# Patient Record
Sex: MALE | Race: WHITE
[De-identification: names, ages, dates, MRNs, and addresses within clinical notes are randomized per-mention and may not be internally consistent; named-entity substitution may affect disease eponyms.]

---

## 2023-01-17 ENCOUNTER — HOSPITAL ENCOUNTER (OUTPATIENT)
Dept: HOSPITAL 76 - LAB.S | Age: 73
Discharge: HOME | End: 2023-01-17
Attending: RADIOLOGY
Payer: MEDICARE

## 2023-01-17 DIAGNOSIS — C61: Primary | ICD-10-CM

## 2023-01-17 PROCEDURE — 36415 COLL VENOUS BLD VENIPUNCTURE: CPT

## 2023-01-17 PROCEDURE — 84403 ASSAY OF TOTAL TESTOSTERONE: CPT

## 2023-02-01 ENCOUNTER — HOSPITAL ENCOUNTER (OUTPATIENT)
Dept: HOSPITAL 76 - LAB.S | Age: 73
Discharge: HOME | End: 2023-02-01
Attending: RADIOLOGY
Payer: MEDICARE

## 2023-02-01 DIAGNOSIS — C61: Primary | ICD-10-CM

## 2023-02-01 LAB
ALBUMIN DIAFP-MCNC: 3.8 G/DL (ref 3.2–5.5)
ALBUMIN/GLOB SERPL: 1.2 {RATIO} (ref 1–2.2)
ALP SERPL-CCNC: 81 IU/L (ref 42–121)
ALT SERPL W P-5'-P-CCNC: 21 IU/L (ref 10–60)
ANION GAP SERPL CALCULATED.4IONS-SCNC: 10 MMOL/L (ref 6–13)
AST SERPL W P-5'-P-CCNC: 19 IU/L (ref 10–42)
BASOPHILS NFR BLD AUTO: 0.1 10^3/UL (ref 0–0.1)
BASOPHILS NFR BLD AUTO: 0.8 %
BILIRUB BLD-MCNC: 0.5 MG/DL (ref 0.2–1)
BUN SERPL-MCNC: 21 MG/DL (ref 6–20)
CALCIUM UR-MCNC: 9.6 MG/DL (ref 8.5–10.3)
CHLORIDE SERPL-SCNC: 102 MMOL/L (ref 101–111)
CO2 SERPL-SCNC: 25 MMOL/L (ref 21–32)
CREAT SERPLBLD-SCNC: 1.2 MG/DL (ref 0.6–1.2)
EOSINOPHIL # BLD AUTO: 0.1 10^3/UL (ref 0–0.7)
EOSINOPHIL NFR BLD AUTO: 1.8 %
ERYTHROCYTE [DISTWIDTH] IN BLOOD BY AUTOMATED COUNT: 17.8 % (ref 12–15)
GFRSERPLBLD MDRD-ARVRAT: 60 ML/MIN/{1.73_M2} (ref 89–?)
GLOBULIN SER-MCNC: 3.1 G/DL (ref 2.1–4.2)
GLUCOSE SERPL-MCNC: 119 MG/DL (ref 70–100)
HCT VFR BLD AUTO: 35.3 % (ref 42–52)
HGB UR QL STRIP: 10.8 G/DL (ref 14–18)
LYMPHOCYTES # SPEC AUTO: 1.4 10^3/UL (ref 1.5–3.5)
LYMPHOCYTES NFR BLD AUTO: 22.7 %
MCH RBC QN AUTO: 28.6 PG (ref 27–31)
MCHC RBC AUTO-ENTMCNC: 30.6 G/DL (ref 32–36)
MCV RBC AUTO: 93.4 FL (ref 80–94)
MONOCYTES # BLD AUTO: 0.6 10^3/UL (ref 0–1)
MONOCYTES NFR BLD AUTO: 9 %
NEUTROPHILS # BLD AUTO: 4 10^3/UL (ref 1.5–6.6)
NEUTROPHILS # SNV AUTO: 6.1 X10^3/UL (ref 4.8–10.8)
NEUTROPHILS NFR BLD AUTO: 65.5 %
NRBC # BLD AUTO: 0 /100WBC
NRBC # BLD AUTO: 0 X10^3/UL
PDW BLD AUTO: 10.8 FL (ref 7.4–11.4)
PLATELET # BLD: 343 10^3/UL (ref 130–450)
POTASSIUM SERPL-SCNC: 4 MMOL/L (ref 3.5–5)
PROT SPEC-MCNC: 6.9 G/DL (ref 6.7–8.2)
RBC MAR: 3.78 10^6/UL (ref 4.7–6.1)
SODIUM SERPLBLD-SCNC: 137 MMOL/L (ref 135–145)

## 2023-02-01 PROCEDURE — 80053 COMPREHEN METABOLIC PANEL: CPT

## 2023-02-01 PROCEDURE — 85025 COMPLETE CBC W/AUTO DIFF WBC: CPT

## 2023-02-01 PROCEDURE — 84403 ASSAY OF TOTAL TESTOSTERONE: CPT

## 2023-02-01 PROCEDURE — 36415 COLL VENOUS BLD VENIPUNCTURE: CPT

## 2023-02-01 PROCEDURE — 84153 ASSAY OF PSA TOTAL: CPT

## 2023-03-15 ENCOUNTER — HOSPITAL ENCOUNTER (OUTPATIENT)
Dept: HOSPITAL 76 - LAB.R | Age: 73
Discharge: HOME | End: 2023-03-15
Attending: RADIOLOGY
Payer: MEDICARE

## 2023-03-15 DIAGNOSIS — C61: Primary | ICD-10-CM

## 2023-03-15 LAB
ALBUMIN DIAFP-MCNC: 3.8 G/DL (ref 3.2–5.5)
ALBUMIN/GLOB SERPL: 1.1 {RATIO} (ref 1–2.2)
ALP SERPL-CCNC: 94 IU/L (ref 42–121)
ALT SERPL W P-5'-P-CCNC: 20 IU/L (ref 10–60)
ANION GAP SERPL CALCULATED.4IONS-SCNC: 8 MMOL/L (ref 6–13)
AST SERPL W P-5'-P-CCNC: 19 IU/L (ref 10–42)
BASOPHILS NFR BLD AUTO: 0.1 10^3/UL (ref 0–0.1)
BASOPHILS NFR BLD AUTO: 0.9 %
BILIRUB BLD-MCNC: 0.5 MG/DL (ref 0.2–1)
BUN SERPL-MCNC: 16 MG/DL (ref 6–20)
CALCIUM UR-MCNC: 9.6 MG/DL (ref 8.5–10.3)
CHLORIDE SERPL-SCNC: 103 MMOL/L (ref 101–111)
CO2 SERPL-SCNC: 27 MMOL/L (ref 21–32)
CREAT SERPLBLD-SCNC: 1.1 MG/DL (ref 0.6–1.2)
EOSINOPHIL # BLD AUTO: 0.1 10^3/UL (ref 0–0.7)
EOSINOPHIL NFR BLD AUTO: 2.4 %
ERYTHROCYTE [DISTWIDTH] IN BLOOD BY AUTOMATED COUNT: 16.4 % (ref 12–15)
GFRSERPLBLD MDRD-ARVRAT: 66 ML/MIN/{1.73_M2} (ref 89–?)
GLOBULIN SER-MCNC: 3.4 G/DL (ref 2.1–4.2)
GLUCOSE SERPL-MCNC: 95 MG/DL (ref 70–100)
HCT VFR BLD AUTO: 36.2 % (ref 42–52)
HGB UR QL STRIP: 11 G/DL (ref 14–18)
LYMPHOCYTES # SPEC AUTO: 1 10^3/UL (ref 1.5–3.5)
LYMPHOCYTES NFR BLD AUTO: 19 %
MCH RBC QN AUTO: 27.1 PG (ref 27–31)
MCHC RBC AUTO-ENTMCNC: 30.4 G/DL (ref 32–36)
MCV RBC AUTO: 89.2 FL (ref 80–94)
MONOCYTES # BLD AUTO: 0.5 10^3/UL (ref 0–1)
MONOCYTES NFR BLD AUTO: 9.6 %
NEUTROPHILS # BLD AUTO: 3.6 10^3/UL (ref 1.5–6.6)
NEUTROPHILS # SNV AUTO: 5.3 X10^3/UL (ref 4.8–10.8)
NEUTROPHILS NFR BLD AUTO: 67.9 %
NRBC # BLD AUTO: 0 /100WBC
NRBC # BLD AUTO: 0 X10^3/UL
PDW BLD AUTO: 10.1 FL (ref 7.4–11.4)
PLATELET # BLD: 282 10^3/UL (ref 130–450)
POTASSIUM SERPL-SCNC: 4 MMOL/L (ref 3.5–5)
PROT SPEC-MCNC: 7.2 G/DL (ref 6.7–8.2)
RBC MAR: 4.06 10^6/UL (ref 4.7–6.1)
SODIUM SERPLBLD-SCNC: 138 MMOL/L (ref 135–145)

## 2023-03-15 PROCEDURE — 84403 ASSAY OF TOTAL TESTOSTERONE: CPT

## 2023-03-15 PROCEDURE — 80053 COMPREHEN METABOLIC PANEL: CPT

## 2023-03-15 PROCEDURE — 85025 COMPLETE CBC W/AUTO DIFF WBC: CPT

## 2023-03-15 PROCEDURE — 84153 ASSAY OF PSA TOTAL: CPT

## 2023-07-03 ENCOUNTER — HOSPITAL ENCOUNTER (EMERGENCY)
Dept: HOSPITAL 76 - ED | Age: 73
Discharge: HOME | End: 2023-07-03
Payer: MEDICARE

## 2023-07-03 VITALS — DIASTOLIC BLOOD PRESSURE: 89 MMHG | SYSTOLIC BLOOD PRESSURE: 132 MMHG

## 2023-07-03 DIAGNOSIS — Z79.899: ICD-10-CM

## 2023-07-03 DIAGNOSIS — I10: ICD-10-CM

## 2023-07-03 DIAGNOSIS — E78.00: ICD-10-CM

## 2023-07-03 DIAGNOSIS — R91.8: ICD-10-CM

## 2023-07-03 DIAGNOSIS — Z87.891: ICD-10-CM

## 2023-07-03 DIAGNOSIS — M94.0: Primary | ICD-10-CM

## 2023-07-03 DIAGNOSIS — R07.89: ICD-10-CM

## 2023-07-03 PROCEDURE — 71250 CT THORAX DX C-: CPT

## 2023-07-03 PROCEDURE — 99284 EMERGENCY DEPT VISIT MOD MDM: CPT

## 2023-07-03 NOTE — CT REPORT
PROCEDURE:  CHEST WO

 

INDICATIONS:  right parasternal pain, onset shoveling

 

TECHNIQUE: 

Noncontrast 1mm axial images were acquired from the pulmonary apices to the posterior costophrenic an
gles.  Axial 5 mm soft tissue kernel reconstructions were performed as well as 8 mm axial MIP and cor
onal and sagittal 5 mm reformations. For radiation dose reduction, the following was used: automate
d exposure control, adjustment of mA and/or kV according to patient size.

 

COMPARISON:  None

 

FINDINGS:  

Image quality:  Excellent.  

 

Lungs and pleura: No consolidation. No pleural effusions. No  pneumothorax. Moderate centrilobular an
d paraseptal emphysema. Scattered solid pulmonary nodules, measuring no greater than 5 mm. Examples i
nclude:

-3 mm solid nodule, anterior right lower lobe (series 4, image 185).

-3.5 mm solid nodule, right lung apex (series 4, image 59)  

 

Mediastinum: Heart size is enlarged. No pericardial effusion. No large vessel abnormality. No mediast
inal adenopathy by size criteria.  Annular calcification of mitral valve. Three-vessel coronary calci
fications. Left chest wall port tip terminates in the high right atrium.

 

Chest wall and lower neck: Thyroid is unremarkable. No axillary or supraclavicular adenopathy by size
. Bilateral gynecomastia.

 

Bones: A few punctate dense lesions within the spine, nonspecific.

 

Upper Abdomen: Unremarkable.

 

IMPRESSION:

No acute findings explaining the patient's parasternal pain.

 

2 vessel coronary disease, moderate to severe for age.

 

Scattered solid pulmonary nodules, which could be postinfectious/inflammatory or indicate metastatic 
disease.

 

Reviewed by: Tyree Wyatt on 7/3/2023 8:44 AM PDT

Approved by: Tyree Wyatt on 7/3/2023 8:44 AM PDT

 

 

Station ID:  [id number]

## 2023-07-03 NOTE — ED PHYSICIAN DOCUMENTATION
PD HPI CHEST PAIN





- Stated complaint


Stated Complaint: RIB PX





- History obtained from


History obtained from: Patient





- History of Present Illness


Timing - onset: How many months ago (2)


Timing - onset during: Exertion (he states the onset of the pain there was while

he was shoveling and he hit a rock with shovel tip, and caused a jarring of the 

chest, with pain right parasternal. This continued to some degree since. More 

hurting the past 2 weeks. No rash/redness. No creptitance/deformity to feel. 

Pain with movemen)


Timing - details: Abrupt onset, Still present, Waxing and waning


Quality: Aching, Sharp, Pain


Location: Right chest (immediately parasternal at mid sternal level.)


Radiation: No: Neck, Back


Improved by: Rest


Worsened by: Movement, Palpation.  No: Inspiration


Associated symptoms: No: Shortness of air, Nausea, Feeling faint / dizzy, Cough


Similar symptoms before: Has not had sx before





Review of Systems


Constitutional: denies: Fever, Chills


Nose: denies: Rhinorrhea / runny nose, Congestion


Throat: denies: Sore throat


Cardiac: reports: Chest pain / pressure.  denies: Palpitations, Pedal edema, 

Calf pain


Respiratory: denies: Cough


Skin: denies: Rash, Lesions





PD PAST MEDICAL HISTORY





- Past Medical History


Cardiovascular: Hypertension, High cholesterol


Respiratory: Sleep apnea


Neuro: None


Endocrine/Autoimmune: None


GI: None


: Other (PROSTATE CANCER)


HEENT: Chronic vision loss, Other


Psych: Anxiety


Musculoskeletal: Osteoarthritis, Other (HIP DJD)


Derm: None





- Past Surgical History


Ortho: Other (LOWER LEFT LEG ORIF)





- Present Medications


Home Medications: 


                                Ambulatory Orders











 Medication  Instructions  Recorded  Confirmed


 


Ascorbic Acid [Vitamin C] 1,000 mg PO DAILY 08/10/22 03/06/23


 


Atorvastatin Calcium 40 mg PO DAILY 08/10/22 03/06/23


 


Cholecalciferol [Vitamin D3] 25 mcg PO DAILY 08/10/22 03/06/23


 


Multivitamin 1 each PO DAILY 08/10/22 03/06/23


 


Omega-3/Dha/Epa/Fish Oil [Fish Oil 1 each PO DAILY 08/10/22 03/06/23





1,000 mg Softgel]   


 


atenoloL [Tenormin] 50 mg PO DAILY 08/10/22 03/06/23


 


hydroCHLOROthiazide [Hydrodiuril] 25 mg PO DAILY 08/10/22 03/06/23


 


Lidocaine/Prilocain 2.5% Cream 5 applic TOP UD #1 gm 08/15/22 03/06/23





[Emla 2.5% Cream]   


 


Calcium Carbonate [Calcium]  11/14/22 


 


Abiraterone Acetate 250 mg PO DAILY 01/13/23 03/06/23


 


Meloxicam [Mobic] 7.5 mg PO BID 10 Days #20 tablet 07/03/23 














- Allergies


Allergies/Adverse Reactions: 


                                    Allergies











Allergy/AdvReac Type Severity Reaction Status Date / Time


 


No Known Drug Allergies Allergy   Verified 07/03/23 07:23














- Social History


Smoking Status: Former smoker (QUIT 2007; 1 + 1/2 PPD)





PD ED PE NORMAL





- Vitals


Vital signs reviewed: Yes





- General


General: Alert and oriented X 3, No acute distress, Well developed/nourished





- HEENT


HEENT: Pharynx benign





- Neck


Neck: Supple, no meningeal sign, No adenopathy





- Cardiac


Cardiac: RRR, No murmur





- Respiratory


Respiratory: Clear bilaterally, Other (chest wall tender in right parasternal 

area at cartilage. No redness nor sores/rash. no crepitance. )





Results





- Vitals


Vitals: 


                               Vital Signs - 24 hr











  07/03/23 07/03/23





  07:20 10:16


 


Temperature 36.3 C L 36.4 C L


 


Heart Rate 59 L 60


 


Respiratory 20 20





Rate  


 


Blood Pressure 132/105 H 132/89 H


 


O2 Saturation 98 98








                                     Oxygen











O2 Source                      Room air

















- Rads (name of study)


  ** chest CT


Relevant Findings:: Prelim report reviewed (no abnormalitiy in the area of the 

sternum to account for the pain. Multiple lung parenchymal nodules up to 3-3.5 

mm size. ), See rad report





PD Medical Decision Making





- ED course


Complexity details: reviewed results (CT chest shows normal in the area that is 

hurting. There are multiple small up to 3-3.5 mm nodules that could be 

inflammatory, infectious or early metastatic. ), considered differential 

(consider muscle strain pectoral vs costochonritis, but with his history of 

prostate CA, the concern for bone met or lung mass in area would be warranted 

and evaluated. ), d/w patient


Reviewed Lab Results: 





the CT shows multiple small dense nodules that are very small. The patient is 

getting a PET scan for in a few weeks by his Oncologist, so will better define 

the lesions I presume. I convveyed the findings with kiki nbd gave a copy of the

 CT report.  for the chest wall pain, presume muscle tendonitis/strain versus 

more likely some focal costochondritis. To use NSAIDs regularly and add tylenol.

 He did not want any opioid type meds. 





Departure





- Departure


Disposition: 01 Home, Self Care


Clinical Impression: 


 Chest wall pain, Costochondritis, acute, Multiple lung nodules on CT





Condition: Stable


Record reviewed to determine appropriate education?: Yes


Instructions:  ED Chest Pain Costochondritis


Prescriptions: 


Meloxicam [Mobic] 7.5 mg PO BID 10 Days #20 tablet


Comments: 


Your CT scan does not show any abnormalities in the area that you are hurting.  

This means no obvious fractures, dislocation, bone lesions or masses in that 

spot.





They do comment on several small nodules with largest approximately 3 to 3-1/2 

mm (fairly small).  These will be better evaluated with your PET scan coming up 

this month.





The pain in the chest wall would seem to relate to some inflammation of the 

cartilage or muscle at that spot.  We can try treating this with an anti-

inflammatory regularly for the next 7 to 10 days.  I wrote for some meloxicam 

which is a twice a day only anti-inflammatory.





To that add Tylenol 500 to 650 mg 4 times daily to help with pain.





Follow-up with your primary care if not improved well over the next several days

 and resolved by week.





I sent your prescription to [facility] pharmacy.


Discharge Date/Time: 07/03/23 10:16

## 2023-07-27 ENCOUNTER — HOSPITAL ENCOUNTER (OUTPATIENT)
Dept: HOSPITAL 76 - LAB | Age: 73
Discharge: HOME | End: 2023-07-27
Attending: RADIOLOGY
Payer: MEDICARE

## 2023-07-27 DIAGNOSIS — C61: Primary | ICD-10-CM

## 2023-07-27 LAB
ALBUMIN DIAFP-MCNC: 4.4 G/DL (ref 3.2–5.5)
ALBUMIN/GLOB SERPL: 1.5 {RATIO} (ref 1–2.2)
ALP SERPL-CCNC: 108 IU/L (ref 42–121)
ALT SERPL W P-5'-P-CCNC: 13 IU/L (ref 10–60)
ANION GAP SERPL CALCULATED.4IONS-SCNC: 5 MMOL/L (ref 6–13)
AST SERPL W P-5'-P-CCNC: 13 IU/L (ref 10–42)
BASOPHILS NFR BLD AUTO: 0 10^3/UL (ref 0–0.1)
BASOPHILS NFR BLD AUTO: 0.6 %
BILIRUB BLD-MCNC: 0.3 MG/DL (ref 0.2–1)
BUN SERPL-MCNC: 21 MG/DL (ref 6–20)
CALCIUM UR-MCNC: 10.1 MG/DL (ref 8.5–10.3)
CHLORIDE SERPL-SCNC: 103 MMOL/L (ref 101–111)
CO2 SERPL-SCNC: 29 MMOL/L (ref 21–32)
CREAT SERPLBLD-SCNC: 1.3 MG/DL (ref 0.6–1.3)
EOSINOPHIL # BLD AUTO: 0.1 10^3/UL (ref 0–0.7)
EOSINOPHIL NFR BLD AUTO: 1.6 %
ERYTHROCYTE [DISTWIDTH] IN BLOOD BY AUTOMATED COUNT: 14.8 % (ref 12–15)
GFRSERPLBLD MDRD-ARVRAT: 54 ML/MIN/{1.73_M2} (ref 89–?)
GLOBULIN SER-MCNC: 2.9 G/DL (ref 2.1–4.2)
GLUCOSE SERPL-MCNC: 97 MG/DL (ref 74–104)
HCT VFR BLD AUTO: 32.2 % (ref 42–52)
HGB UR QL STRIP: 10.5 G/DL (ref 14–18)
LYMPHOCYTES # SPEC AUTO: 0.8 10^3/UL (ref 1.5–3.5)
LYMPHOCYTES NFR BLD AUTO: 15.2 %
MCH RBC QN AUTO: 29.1 PG (ref 27–31)
MCHC RBC AUTO-ENTMCNC: 32.6 G/DL (ref 32–36)
MCV RBC AUTO: 89.2 FL (ref 80–94)
MONOCYTES # BLD AUTO: 0.6 10^3/UL (ref 0–1)
MONOCYTES NFR BLD AUTO: 10.9 %
NEUTROPHILS # BLD AUTO: 3.6 10^3/UL (ref 1.5–6.6)
NEUTROPHILS # SNV AUTO: 5.1 X10^3/UL (ref 4.8–10.8)
NEUTROPHILS NFR BLD AUTO: 71.5 %
NRBC # BLD AUTO: 0 /100WBC
NRBC # BLD AUTO: 0 X10^3/UL
PDW BLD AUTO: 9.6 FL (ref 7.4–11.4)
PLATELET # BLD: 282 10^3/UL (ref 130–450)
POTASSIUM SERPL-SCNC: 4 MMOL/L (ref 3.5–4.5)
PROT SPEC-MCNC: 7.3 G/DL (ref 6.4–8.9)
RBC MAR: 3.61 10^6/UL (ref 4.7–6.1)
SODIUM SERPLBLD-SCNC: 137 MMOL/L (ref 135–145)

## 2023-07-27 PROCEDURE — 84153 ASSAY OF PSA TOTAL: CPT

## 2023-07-27 PROCEDURE — 36415 COLL VENOUS BLD VENIPUNCTURE: CPT

## 2023-07-27 PROCEDURE — 85025 COMPLETE CBC W/AUTO DIFF WBC: CPT

## 2023-07-27 PROCEDURE — 80053 COMPREHEN METABOLIC PANEL: CPT

## 2023-07-27 PROCEDURE — 84403 ASSAY OF TOTAL TESTOSTERONE: CPT

## 2023-10-09 ENCOUNTER — HOSPITAL ENCOUNTER (OUTPATIENT)
Dept: HOSPITAL 76 - EMS | Age: 73
Discharge: ANOTHER HOSPITAL | End: 2023-10-09
Payer: MEDICARE

## 2023-10-09 DIAGNOSIS — R53.1: ICD-10-CM

## 2023-10-09 DIAGNOSIS — Z74.09: ICD-10-CM

## 2023-10-09 DIAGNOSIS — R20.0: Primary | ICD-10-CM

## 2023-10-11 ENCOUNTER — HOSPITAL ENCOUNTER (INPATIENT)
Dept: HOSPITAL 76 - MS2 | Age: 73
LOS: 6 days | Discharge: NURSING, REHAB OR LONG-TERM CARE | DRG: 948 | End: 2023-10-17
Attending: HOSPITALIST | Admitting: HOSPITALIST
Payer: MEDICARE

## 2023-10-11 DIAGNOSIS — R41.0: ICD-10-CM

## 2023-10-11 DIAGNOSIS — F32.A: ICD-10-CM

## 2023-10-11 DIAGNOSIS — Z87.891: ICD-10-CM

## 2023-10-11 DIAGNOSIS — K59.09: ICD-10-CM

## 2023-10-11 DIAGNOSIS — G47.33: ICD-10-CM

## 2023-10-11 DIAGNOSIS — I12.9: ICD-10-CM

## 2023-10-11 DIAGNOSIS — N18.30: ICD-10-CM

## 2023-10-11 DIAGNOSIS — M85.88: ICD-10-CM

## 2023-10-11 DIAGNOSIS — N40.1: ICD-10-CM

## 2023-10-11 DIAGNOSIS — R33.8: ICD-10-CM

## 2023-10-11 DIAGNOSIS — C61: ICD-10-CM

## 2023-10-11 DIAGNOSIS — F41.9: ICD-10-CM

## 2023-10-11 DIAGNOSIS — G95.29: ICD-10-CM

## 2023-10-11 DIAGNOSIS — I95.9: ICD-10-CM

## 2023-10-11 DIAGNOSIS — R00.1: ICD-10-CM

## 2023-10-11 DIAGNOSIS — C79.51: ICD-10-CM

## 2023-10-11 DIAGNOSIS — R13.10: ICD-10-CM

## 2023-10-11 DIAGNOSIS — G89.3: Primary | ICD-10-CM

## 2023-10-11 DIAGNOSIS — G83.89: ICD-10-CM

## 2023-10-11 RX ADMIN — GABAPENTIN SCH MG: 300 CAPSULE ORAL at 23:53

## 2023-10-11 RX ADMIN — MORPHINE SULFATE PRN MG: 2 INJECTION, SOLUTION INTRAMUSCULAR; INTRAVENOUS at 17:34

## 2023-10-11 RX ADMIN — DEXAMETHASONE SODIUM PHOSPHATE SCH MG: 4 INJECTION, SOLUTION INTRA-ARTICULAR; INTRALESIONAL; INTRAMUSCULAR; INTRAVENOUS; SOFT TISSUE at 23:52

## 2023-10-11 RX ADMIN — MIRTAZAPINE SCH MG: 15 TABLET, FILM COATED ORAL at 22:27

## 2023-10-11 RX ADMIN — BACLOFEN SCH MG: 10 TABLET ORAL at 22:28

## 2023-10-11 RX ADMIN — MORPHINE SULFATE PRN MG: 2 INJECTION, SOLUTION INTRAMUSCULAR; INTRAVENOUS at 20:28

## 2023-10-11 RX ADMIN — GABAPENTIN SCH MG: 300 CAPSULE ORAL at 20:28

## 2023-10-11 RX ADMIN — METHADONE HYDROCHLORIDE SCH MG: 5 TABLET ORAL at 22:27

## 2023-10-11 RX ADMIN — DEXAMETHASONE SODIUM PHOSPHATE SCH MG: 4 INJECTION, SOLUTION INTRA-ARTICULAR; INTRALESIONAL; INTRAMUSCULAR; INTRAVENOUS; SOFT TISSUE at 17:34

## 2023-10-11 NOTE — HISTORY & PHYSICAL EXAMINATION
History and Physical





- History and Physical





74 yo male w/metastatic prostate cancer, dx'd in 2016. He was found to have bone

mets in 7/23  (Rib, Rt T8, femur). He underwent XRT. He admitted to hospice on 


9/27/23 as he did not want to pursue further systemic therapy. He notes he has 

been doing very well overall despite the extent of his disease process. He


notes his pain has been fairly well controlled. He has been ambulatory and was 

out in his shop as recently as 3 days ago.. He'd been having some pain in his 

legs, but it was manageable. He also had been having some back pain, but it was 

improved w/current interventions (methadone, dexamethasone). He woke up around 2

am on 10/9/23 and took a shower. He notes he was okay at that time. However, 

later in the morning he noted weakness in the legs as well as onset of numbness.

He denied any falls. He noted that he felt "numb" from just below the nipple 

line down. He could identify pressure but not sensation. He stated he could move

his Lt leg, but not his right. He had not urinated since 0200 and did not feel 

any urge to urinate.





On exam, he had a T 5/6 dermatome level acute paralysis.  He was hyperreflexic. 

RN placed a miranda w/1400 cc urine output.  Pt elected to revoke hospice and 

pursue further diagnostics (and possible intervention).  He transferred to 

[facility] for further evaluation.  He was admitted, placed on IV 

steroids and ultimately underwent MRI on 10/10/23 which revealed "tumor wrapped 

around the spinal cord" at C5 and it was felt there was no intervention 

available for him.  After admission to the hospital, he did develop ascending 

paresis which impacted his RUE and has caused some difficulty w/deep breathing. 

Today, the Palliative Care team contacted  Hospice re: readmission and GIP 

level of care for mgmt of his IV pain medication needs.  The PC MD reported to 

me that he was needing frequent IV morphine for comfort.





Currently, pt c/o neuropathic pain primarily impacting his RUE.  He also notes 

if he moves his head too far, he develops neuropathic sxs extending down 

Bilateral medial thighs.  He continues to note some difficulty w/taking a deep 

breath and notes that attempting to take a deep breath increases the neuropathic

pain in his RUE.  He has not had a BM in 3 days.





PMH:


Metastatic prostate cancer


BPH


HTN


CKD3


JOHNNY


osteopenia


depression/anxiety


former smoker





Soc Hx:


Former smoker.  Former alcohol use (6 pack/day).  Lives in [place], [place] w/wife 

and son.





Exam:


T 36.3, HR 44 RR 16 /33 O2 sat 99% RA; pain rated 10/10 on admission


Gen- very pleasant elderly male, Alert and oriented x 3, appears uncomfortable 

when moving his RUE


HEENT - NC, face symmetric, PERRLA, EOMI, sclera anicteric, conjunctiva pale but

clear, OP clear, fair dentition


Chest -Resp excursions symmetric,  CTAB


CV - bradycardic w/RR, no M/R/G


Abd-soft, NT/ND, BTx4, no obvious masses/HSM


Extr-warm, no C/C/E, no sensation to BLEs


Skin-warm, dry, no rash


Neuro- absent sensation/movement to BLEs, able to move LUE w/o difficulty, RUE 

w/decreased function and sensation





Assessment:


1. Acute spinal cord compression d/t C5 lesion


2. Metastatic prostate cancer


3. Bradycardia


4. Urinary retention


5. Constipation





Pt w/acute onset of paralysis on 10/9/23, found to be d/t spinal cord 

compression from metastasis to C5.  He is having severe neuropathic pain, some 

respiratory symptoms and autonomic instability w/bradycardia and hypotension 

(reported at Prov).  Additionally, he has urinary retention and constipation 

consistent w/paralysis.  Will admit to [facility] level of care.  





Place on :    dexamethasone 4 mg IV q6


      IV morphine PRN


      Titrate gabapentin


      Add baclofen and d/c flexeril


      Add daily suppository - may need digital stim if ineffective


      Continue methadone


      


Pt is appropriate for GIP level of care for monitoring of sxs related to cord 

compression, mgmt of 10/10 pain, and need for both IV steroids and IV morphine. 

These interventions could not be accomplished at a lower level of care.





Code status: DNR/DNI

## 2023-10-12 RX ADMIN — MORPHINE SULFATE PRN MG: 2 INJECTION, SOLUTION INTRAMUSCULAR; INTRAVENOUS at 13:01

## 2023-10-12 RX ADMIN — DEXAMETHASONE SODIUM PHOSPHATE SCH MG: 4 INJECTION, SOLUTION INTRA-ARTICULAR; INTRALESIONAL; INTRAMUSCULAR; INTRAVENOUS; SOFT TISSUE at 17:12

## 2023-10-12 RX ADMIN — MORPHINE SULFATE PRN MG: 2 INJECTION, SOLUTION INTRAMUSCULAR; INTRAVENOUS at 17:13

## 2023-10-12 RX ADMIN — MORPHINE SULFATE PRN MG: 2 INJECTION, SOLUTION INTRAMUSCULAR; INTRAVENOUS at 22:12

## 2023-10-12 RX ADMIN — BACLOFEN SCH MG: 10 TABLET ORAL at 22:13

## 2023-10-12 RX ADMIN — DIMETHICONE PRN APPLIC: 13000 CREAM TOPICAL at 08:23

## 2023-10-12 RX ADMIN — PANTOPRAZOLE SODIUM SCH MG: 40 TABLET, DELAYED RELEASE ORAL at 06:10

## 2023-10-12 RX ADMIN — MORPHINE SULFATE PRN MG: 2 INJECTION, SOLUTION INTRAMUSCULAR; INTRAVENOUS at 16:01

## 2023-10-12 RX ADMIN — BACLOFEN SCH MG: 10 TABLET ORAL at 13:06

## 2023-10-12 RX ADMIN — BACLOFEN SCH MG: 10 TABLET ORAL at 06:10

## 2023-10-12 RX ADMIN — MORPHINE SULFATE PRN MG: 2 INJECTION, SOLUTION INTRAMUSCULAR; INTRAVENOUS at 20:02

## 2023-10-12 RX ADMIN — DIMETHICONE PRN APPLIC: 13000 CREAM TOPICAL at 18:17

## 2023-10-12 RX ADMIN — METHADONE HYDROCHLORIDE SCH MG: 5 TABLET ORAL at 22:14

## 2023-10-12 RX ADMIN — GABAPENTIN PRN MG: 300 CAPSULE ORAL at 20:03

## 2023-10-12 RX ADMIN — GABAPENTIN SCH MG: 300 CAPSULE ORAL at 13:01

## 2023-10-12 RX ADMIN — GABAPENTIN PRN MG: 300 CAPSULE ORAL at 16:01

## 2023-10-12 RX ADMIN — GABAPENTIN SCH MG: 300 CAPSULE ORAL at 22:14

## 2023-10-12 RX ADMIN — DEXAMETHASONE SODIUM PHOSPHATE SCH MG: 4 INJECTION, SOLUTION INTRA-ARTICULAR; INTRALESIONAL; INTRAMUSCULAR; INTRAVENOUS; SOFT TISSUE at 06:12

## 2023-10-12 RX ADMIN — MORPHINE SULFATE PRN MG: 2 INJECTION, SOLUTION INTRAMUSCULAR; INTRAVENOUS at 11:11

## 2023-10-12 RX ADMIN — DEXAMETHASONE SODIUM PHOSPHATE SCH MG: 4 INJECTION, SOLUTION INTRA-ARTICULAR; INTRALESIONAL; INTRAMUSCULAR; INTRAVENOUS; SOFT TISSUE at 11:12

## 2023-10-12 RX ADMIN — MORPHINE SULFATE PRN MG: 2 INJECTION, SOLUTION INTRAMUSCULAR; INTRAVENOUS at 18:15

## 2023-10-12 RX ADMIN — METHADONE HYDROCHLORIDE SCH MG: 5 TABLET ORAL at 13:01

## 2023-10-12 RX ADMIN — BISACODYL SCH MG: 10 SUPPOSITORY RECTAL at 08:23

## 2023-10-12 RX ADMIN — MORPHINE SULFATE PRN MG: 2 INJECTION, SOLUTION INTRAMUSCULAR; INTRAVENOUS at 06:20

## 2023-10-12 RX ADMIN — MORPHINE SULFATE PRN MG: 2 INJECTION, SOLUTION INTRAMUSCULAR; INTRAVENOUS at 09:58

## 2023-10-12 RX ADMIN — MIRTAZAPINE SCH MG: 15 TABLET, FILM COATED ORAL at 22:14

## 2023-10-12 RX ADMIN — DIMETHICONE PRN APPLIC: 13000 CREAM TOPICAL at 22:12

## 2023-10-12 RX ADMIN — MORPHINE SULFATE PRN MG: 2 INJECTION, SOLUTION INTRAMUSCULAR; INTRAVENOUS at 08:22

## 2023-10-12 RX ADMIN — GABAPENTIN SCH MG: 300 CAPSULE ORAL at 06:12

## 2023-10-12 RX ADMIN — METHADONE HYDROCHLORIDE SCH MG: 5 TABLET ORAL at 06:12

## 2023-10-12 NOTE — PROVIDER PROGRESS NOTE
Progress Note


72 yo male w/metastatic prostate cancer w/C5 spinal cord compression causing 

acute ascending paralysis.





S:


Pt reports he slept for about 4 hours overnight.  His pain increased and woke 

him at 6am.  He notes his pain was 8/10 this am prior to receiving IV meds.  Now

it is down to 4/10 w/intervention.  He received a suppository this am, but has 

not had a BM yet.  Some persistent respiratory difficulty when trying to take a 

full breath.





O:


Gen-A&Ox3, pleasant middle aged male


HEENT - NC, face symmetric


Chest - CTAB


CV - bradycardic w/RR, no M/R/G


Abd-soft, NT, ND, BT hypoactive


Extr- warm, No C/C/E


Neuro - decreased dexterity w/RUE but still moving it fairly well, no 

involvement to LUE, flaccid paralysis to BLEs





A:


1. Acute spinal cord compression d/t C5 lesion


2. Metastatic prostate cancer


3. Bradycardia


4. Urinary retention


5. Constipation





P:


Continue IV morphine PRN, scheduled IV dexamethasone, baclofen, methadone, 

gabapentin both scheduled and PRN


He appears slightly more bradycardic this am than yesterday.  Will monitor.


Continue miranda catheter.


RNCM checked rectal vault while we visited and noted soft stool starting to come

out of the rectum.


Hospice MSW working on post-GIP resources as well.


Discussed a visit from Hospice Chaplain-pt declined.





Discussed likely course of disease progression to include respiratory failure 

and progressive paralysis of the BUEs.  Explained that we will titrate 

medications to control dyspnea, anxiety, pain.





Continue GIP level of care for management of severe pain and need for both 

scheduled and PRN IV medications.

## 2023-10-13 RX ADMIN — METHADONE HYDROCHLORIDE SCH MG: 5 TABLET ORAL at 06:37

## 2023-10-13 RX ADMIN — MORPHINE SULFATE PRN MG: 2 INJECTION, SOLUTION INTRAMUSCULAR; INTRAVENOUS at 08:36

## 2023-10-13 RX ADMIN — GABAPENTIN SCH MG: 300 CAPSULE ORAL at 14:16

## 2023-10-13 RX ADMIN — BACLOFEN SCH MG: 10 TABLET ORAL at 06:36

## 2023-10-13 RX ADMIN — MORPHINE SULFATE PRN MG: 2 INJECTION, SOLUTION INTRAMUSCULAR; INTRAVENOUS at 07:33

## 2023-10-13 RX ADMIN — METHADONE HYDROCHLORIDE SCH MG: 5 TABLET ORAL at 14:16

## 2023-10-13 RX ADMIN — DEXAMETHASONE SODIUM PHOSPHATE SCH MG: 4 INJECTION, SOLUTION INTRA-ARTICULAR; INTRALESIONAL; INTRAMUSCULAR; INTRAVENOUS; SOFT TISSUE at 17:52

## 2023-10-13 RX ADMIN — DEXAMETHASONE SODIUM PHOSPHATE SCH MG: 4 INJECTION, SOLUTION INTRA-ARTICULAR; INTRALESIONAL; INTRAMUSCULAR; INTRAVENOUS; SOFT TISSUE at 12:34

## 2023-10-13 RX ADMIN — BISACODYL SCH: 10 SUPPOSITORY RECTAL at 08:31

## 2023-10-13 RX ADMIN — DEXAMETHASONE SODIUM PHOSPHATE SCH MG: 4 INJECTION, SOLUTION INTRA-ARTICULAR; INTRALESIONAL; INTRAMUSCULAR; INTRAVENOUS; SOFT TISSUE at 06:36

## 2023-10-13 RX ADMIN — GABAPENTIN SCH MG: 300 CAPSULE ORAL at 06:37

## 2023-10-13 RX ADMIN — BACLOFEN SCH MG: 10 TABLET ORAL at 14:16

## 2023-10-13 RX ADMIN — GABAPENTIN SCH MG: 300 CAPSULE ORAL at 21:12

## 2023-10-13 RX ADMIN — METHADONE HYDROCHLORIDE SCH MG: 5 TABLET ORAL at 21:10

## 2023-10-13 RX ADMIN — DEXAMETHASONE SODIUM PHOSPHATE SCH MG: 4 INJECTION, SOLUTION INTRA-ARTICULAR; INTRALESIONAL; INTRAMUSCULAR; INTRAVENOUS; SOFT TISSUE at 23:55

## 2023-10-13 RX ADMIN — MIRTAZAPINE SCH MG: 15 TABLET, FILM COATED ORAL at 21:12

## 2023-10-13 RX ADMIN — MORPHINE SULFATE PRN MG: 2 INJECTION, SOLUTION INTRAMUSCULAR; INTRAVENOUS at 11:19

## 2023-10-13 RX ADMIN — GABAPENTIN PRN MG: 300 CAPSULE ORAL at 17:12

## 2023-10-13 RX ADMIN — DEXAMETHASONE SODIUM PHOSPHATE SCH: 4 INJECTION, SOLUTION INTRA-ARTICULAR; INTRALESIONAL; INTRAMUSCULAR; INTRAVENOUS; SOFT TISSUE at 06:27

## 2023-10-13 RX ADMIN — BACLOFEN SCH MG: 10 TABLET ORAL at 21:10

## 2023-10-13 RX ADMIN — PANTOPRAZOLE SODIUM SCH MG: 40 TABLET, DELAYED RELEASE ORAL at 06:37

## 2023-10-13 NOTE — PROVIDER PROGRESS NOTE
Progress Note


72 yo male w/metastatic prostate cancer w/C5 spinal cord compression causing 

acute ascending paralysis.





S:


Pt reports he slept for about 4 hours again overnight, but feels his sleep 

quality was improved.  He feels he is better able to take a deep breath w/o pain

in his arm, but has required frequent IV pain medication (received 20 mg IV in 

24 hours).  He was successfully able to have multiple BMs yesterday.  Earlier 

today, he notes he choked on food.  He had pumpkin bread that his dtr brought.  

He notes it got stuck and he couldn't cough it out.  RN came and he required 

deep suctioning to remove the food bolus.  He notes his cough has become 

extremely weak.  He notes his RUE is more "clumsy" than it was yesterday.  Dtr 

notes that he has very little dexterity in his right hand.  He denies any 

decrease in function in his LUE, but notes "a spot" that is starting to feel 

abnormal around his elbow, which is where his sxs started on his RUE.  Pain was 

rated 7-8/10 for most of the past 24 hrs.





O:


Gen-A&Ox3, pleasant middle aged male


HEENT - NC, face symmetric


Chest - CTAB


CV - bradycardic w/RR, no M/R/G


Abd-soft, NT, ND, BT hypoactive


Extr- warm, No C/C/E


Neuro - decreased overall movement and dexterity to RUE, he is using his LUE to 

move the RUE during my visit, flaccid paralysis to BLEs





A:


1. Acute spinal cord compression d/t C5 lesion


2. Metastatic prostate cancer


3. Bradycardia


4. NEW dysphagia


5. Urinary retention


6. Constipation - resolved





P:


Continue IV morphine PRN, scheduled IV dexamethasone, baclofen, methadone, 

gabapentin both scheduled and PRN.  Will increase methadone by 5 mg/day to work 

on improving overall pain control.





He remains bradycardic, about the same as yesterday.  





He has developed new dysphagia today, requiring urgent RN suctioning.





Continue miranda catheter for urinary retention.  Constipation resolved w/use of 

dulcolax suppository.





Hospice MSW working on post-GIP resources.





Pt aware of likelycourse of disease progression to include respiratory failure 

and progressive paralysis of the BUEs.  





Continue GIP level of care for management of uncontrolled pain and need for both

scheduled and PRN IV medications, as well as new onset dysphagia w/inability to 

clear his obstruction.

## 2023-10-14 RX ADMIN — PANTOPRAZOLE SODIUM SCH MG: 40 TABLET, DELAYED RELEASE ORAL at 06:08

## 2023-10-14 RX ADMIN — DEXAMETHASONE SODIUM PHOSPHATE SCH MG: 4 INJECTION, SOLUTION INTRA-ARTICULAR; INTRALESIONAL; INTRAMUSCULAR; INTRAVENOUS; SOFT TISSUE at 23:35

## 2023-10-14 RX ADMIN — METHADONE HYDROCHLORIDE SCH MG: 5 TABLET ORAL at 21:09

## 2023-10-14 RX ADMIN — GABAPENTIN PRN MG: 300 CAPSULE ORAL at 11:00

## 2023-10-14 RX ADMIN — DEXAMETHASONE SODIUM PHOSPHATE SCH MG: 4 INJECTION, SOLUTION INTRA-ARTICULAR; INTRALESIONAL; INTRAMUSCULAR; INTRAVENOUS; SOFT TISSUE at 12:40

## 2023-10-14 RX ADMIN — BACLOFEN SCH MG: 10 TABLET ORAL at 13:54

## 2023-10-14 RX ADMIN — GABAPENTIN PRN MG: 300 CAPSULE ORAL at 16:12

## 2023-10-14 RX ADMIN — GABAPENTIN SCH MG: 300 CAPSULE ORAL at 21:09

## 2023-10-14 RX ADMIN — BACLOFEN SCH MG: 10 TABLET ORAL at 06:07

## 2023-10-14 RX ADMIN — MORPHINE SULFATE PRN MG: 2 INJECTION, SOLUTION INTRAMUSCULAR; INTRAVENOUS at 07:47

## 2023-10-14 RX ADMIN — BISACODYL SCH MG: 10 SUPPOSITORY RECTAL at 09:11

## 2023-10-14 RX ADMIN — DEXAMETHASONE SODIUM PHOSPHATE SCH MG: 4 INJECTION, SOLUTION INTRA-ARTICULAR; INTRALESIONAL; INTRAMUSCULAR; INTRAVENOUS; SOFT TISSUE at 19:07

## 2023-10-14 RX ADMIN — GABAPENTIN SCH MG: 300 CAPSULE ORAL at 13:55

## 2023-10-14 RX ADMIN — METHADONE HYDROCHLORIDE SCH MG: 5 TABLET ORAL at 13:55

## 2023-10-14 RX ADMIN — GABAPENTIN SCH MG: 300 CAPSULE ORAL at 06:09

## 2023-10-14 RX ADMIN — MORPHINE SULFATE PRN MG: 2 INJECTION, SOLUTION INTRAMUSCULAR; INTRAVENOUS at 11:01

## 2023-10-14 RX ADMIN — METHADONE HYDROCHLORIDE SCH MG: 5 TABLET ORAL at 09:10

## 2023-10-14 RX ADMIN — DEXAMETHASONE SODIUM PHOSPHATE SCH MG: 4 INJECTION, SOLUTION INTRA-ARTICULAR; INTRALESIONAL; INTRAMUSCULAR; INTRAVENOUS; SOFT TISSUE at 06:08

## 2023-10-14 RX ADMIN — MIRTAZAPINE SCH MG: 15 TABLET, FILM COATED ORAL at 21:09

## 2023-10-14 RX ADMIN — BACLOFEN SCH MG: 10 TABLET ORAL at 21:09

## 2023-10-14 NOTE — PROVIDER PROGRESS NOTE
Progress Note


74 yo male w/metastatic prostate cancer w/C5 spinal cord compression causing 

acute ascending paralysis.





S:


Over the past 24 hours, pt notes he has continued to have RUE neuropathic pain. 

He notes that he feels he has had some benefit from the increased dose of the 

methadone as well as the extra doses of gabapentin.  He continues to struggle to

sleep.  No aspiration event since yesterday.  He notes he did have to use the 

[place] 2 times yesterday.  Son in law notes that at times the pt's speech has 

been slurred.  He notes that when he first arrived in the pt's room, he seemed a

bit out of it and he was worried the pt had a CVA.





O:


Gen-A&Ox3, pleasant middle aged male


HEENT - NC, face symmetric


Chest - CTAB


CV - bradycardic w/RR, no M/R/G


Abd-soft, NT, ND, BT hypoactive


Extr- warm, No C/C/E


Neuro - poor control of the RUE, he is using his LUE to move the RUE during my 

visit, flaccid paralysis to BLEs





A:


1. Acute spinal cord compression d/t C5 lesion


2. Metastatic prostate cancer


3. Bradycardia


4. Dysphagia


5. Urinary retention


6. Constipation - resolved





P:


Continue IV morphine PRN, scheduled IV dexamethasone, baclofen, methadone, 

gabapentin both scheduled and PRN.  Methadone increased yesterday to 7.5/5/7.5 

w/some improvement.  





He remains bradycardic, about the same as yesterday.  





No further dysphagia since yesterday but new cognitive changes noted today per 

family.





Continue miranda catheter for urinary retention.  Constipation resolved w/use of 

dulcolax suppository.





Hospice MSW working on post-GIP resources.





Pt aware of likely course of disease progression to include respiratory failure 

and progressive paralysis of the BUEs.  





Continue GIP level of care for management of uncontrolled pain and need for both

scheduled and PRN IV medications.  Will also continue to monitor for new neuro 

changes given subtle changes in his speech noted today by family.

## 2023-10-15 RX ADMIN — DEXAMETHASONE SODIUM PHOSPHATE SCH MG: 4 INJECTION, SOLUTION INTRA-ARTICULAR; INTRALESIONAL; INTRAMUSCULAR; INTRAVENOUS; SOFT TISSUE at 12:22

## 2023-10-15 RX ADMIN — BACLOFEN SCH MG: 10 TABLET ORAL at 05:57

## 2023-10-15 RX ADMIN — METHADONE HYDROCHLORIDE SCH MG: 5 TABLET ORAL at 14:31

## 2023-10-15 RX ADMIN — GABAPENTIN SCH MG: 300 CAPSULE ORAL at 14:31

## 2023-10-15 RX ADMIN — BACLOFEN SCH MG: 10 TABLET ORAL at 14:30

## 2023-10-15 RX ADMIN — MIRTAZAPINE SCH MG: 15 TABLET, FILM COATED ORAL at 21:14

## 2023-10-15 RX ADMIN — BISACODYL SCH MG: 10 SUPPOSITORY RECTAL at 08:53

## 2023-10-15 RX ADMIN — GABAPENTIN SCH MG: 300 CAPSULE ORAL at 21:15

## 2023-10-15 RX ADMIN — DEXAMETHASONE SODIUM PHOSPHATE SCH MG: 4 INJECTION, SOLUTION INTRA-ARTICULAR; INTRALESIONAL; INTRAMUSCULAR; INTRAVENOUS; SOFT TISSUE at 19:36

## 2023-10-15 RX ADMIN — GABAPENTIN PRN MG: 300 CAPSULE ORAL at 11:04

## 2023-10-15 RX ADMIN — MORPHINE SULFATE PRN MG: 2 INJECTION, SOLUTION INTRAMUSCULAR; INTRAVENOUS at 12:22

## 2023-10-15 RX ADMIN — MORPHINE SULFATE PRN MG: 2 INJECTION, SOLUTION INTRAMUSCULAR; INTRAVENOUS at 08:53

## 2023-10-15 RX ADMIN — METHADONE HYDROCHLORIDE SCH MG: 5 TABLET ORAL at 08:54

## 2023-10-15 RX ADMIN — MORPHINE SULFATE PRN MG: 2 INJECTION, SOLUTION INTRAMUSCULAR; INTRAVENOUS at 11:04

## 2023-10-15 RX ADMIN — GABAPENTIN SCH MG: 300 CAPSULE ORAL at 05:57

## 2023-10-15 RX ADMIN — PANTOPRAZOLE SODIUM SCH MG: 40 TABLET, DELAYED RELEASE ORAL at 05:57

## 2023-10-15 RX ADMIN — DEXAMETHASONE SODIUM PHOSPHATE SCH MG: 4 INJECTION, SOLUTION INTRA-ARTICULAR; INTRALESIONAL; INTRAMUSCULAR; INTRAVENOUS; SOFT TISSUE at 05:58

## 2023-10-15 RX ADMIN — BACLOFEN SCH MG: 10 TABLET ORAL at 21:15

## 2023-10-15 RX ADMIN — METHADONE HYDROCHLORIDE SCH MG: 5 TABLET ORAL at 21:16

## 2023-10-15 NOTE — PROVIDER PROGRESS NOTE
Progress Note





72 yo male w/metastatic prostate cancer w/C5 spinal cord compression causing 

acute ascending paralysis.


S:


Over the past 24 hours, pt notes he has continued to have RUE neuropathic pain 

and typically rates it as 6-8/10.  So far today, he has needed 3 PRN IV doses of

morphine and 1 of PRN IV lorazepam.  He is using an ice pack for neck pain.  He 

has routinely needed both doses of PRN gabapentin as well.  No further 

aspiration.  He does seem a bit more confused today, having a hard time tracking

conversation.  He believed the hospice RN has 4-5 children (he has known since 

admission that she doesn't have any children).  He also made a couple of strange

conversation transitions that were difficult to follow.  He reports poor sleep.





O:


Gen-A&Ox2-3, pleasant middle aged male


HEENT - NC, face symmetric


Chest - CTAB


CV - mildly bradycardic w/RR, no M/R/G


Abd-soft, NT, ND, BT hypoactive


Extr- warm, No C/C/E


Neuro - poor control of the RUE,with his arm frequently flopping clumsily when 

he moves it, flaccid paralysis to BLEs





A:


1. Acute spinal cord compression d/t C5 lesion


2. Metastatic prostate cancer


3. Bradycardia


4. Dysphagia - no recurrence


5. Urinary retention


6. Confusion





P:


Continue IV morphine PRN, scheduled IV dexamethasone, baclofen, methadone, 

gabapentin both scheduled and PRN.  Will increase gabapentin to 600/300/600. 

Methadone increased 10/13/23 to 7.5/5/7.5.  





Bradycardia is mild at this point.  Asymptomatic. 





No further dysphagia since 10/13.





Continue miranda catheter for urinary retention.  





He is demonstrating some confusion today.  Will monitor.





Hospice MSW working on post-GIP resources.





Pt aware of likely course of disease progression to include respiratory failure 

and progressive paralysis of the BUEs.  





Continue GIP level of care for management of uncontrolled pain and need for both

scheduled and PRN IV medications.

## 2023-10-16 RX ADMIN — DEXAMETHASONE SODIUM PHOSPHATE SCH MG: 4 INJECTION, SOLUTION INTRA-ARTICULAR; INTRALESIONAL; INTRAMUSCULAR; INTRAVENOUS; SOFT TISSUE at 06:50

## 2023-10-16 RX ADMIN — DEXAMETHASONE SODIUM PHOSPHATE SCH MG: 4 INJECTION, SOLUTION INTRA-ARTICULAR; INTRALESIONAL; INTRAMUSCULAR; INTRAVENOUS; SOFT TISSUE at 00:40

## 2023-10-16 RX ADMIN — BACLOFEN SCH MG: 10 TABLET ORAL at 06:45

## 2023-10-16 RX ADMIN — DIMETHICONE PRN APPLIC: 13000 CREAM TOPICAL at 22:25

## 2023-10-16 RX ADMIN — GABAPENTIN SCH MG: 300 CAPSULE ORAL at 09:52

## 2023-10-16 RX ADMIN — MIRTAZAPINE SCH MG: 15 TABLET, FILM COATED ORAL at 20:52

## 2023-10-16 RX ADMIN — BISACODYL SCH MG: 10 SUPPOSITORY RECTAL at 09:52

## 2023-10-16 RX ADMIN — PANTOPRAZOLE SODIUM SCH MG: 40 TABLET, DELAYED RELEASE ORAL at 06:46

## 2023-10-16 RX ADMIN — GABAPENTIN SCH MG: 300 CAPSULE ORAL at 14:08

## 2023-10-16 RX ADMIN — DEXAMETHASONE SODIUM PHOSPHATE SCH MG: 4 INJECTION, SOLUTION INTRA-ARTICULAR; INTRALESIONAL; INTRAMUSCULAR; INTRAVENOUS; SOFT TISSUE at 15:18

## 2023-10-16 RX ADMIN — MORPHINE SULFATE PRN MG: 2 INJECTION, SOLUTION INTRAMUSCULAR; INTRAVENOUS at 09:53

## 2023-10-16 RX ADMIN — GABAPENTIN SCH MG: 300 CAPSULE ORAL at 20:51

## 2023-10-16 RX ADMIN — DEXAMETHASONE SODIUM PHOSPHATE SCH MG: 4 INJECTION, SOLUTION INTRA-ARTICULAR; INTRALESIONAL; INTRAMUSCULAR; INTRAVENOUS; SOFT TISSUE at 22:24

## 2023-10-16 RX ADMIN — BACLOFEN SCH MG: 10 TABLET ORAL at 22:12

## 2023-10-16 RX ADMIN — METHADONE HYDROCHLORIDE SCH MG: 5 TABLET ORAL at 14:21

## 2023-10-16 RX ADMIN — MORPHINE SULFATE PRN MG: 2 INJECTION, SOLUTION INTRAMUSCULAR; INTRAVENOUS at 00:50

## 2023-10-16 RX ADMIN — METHADONE HYDROCHLORIDE SCH MG: 5 TABLET ORAL at 10:40

## 2023-10-16 RX ADMIN — BACLOFEN SCH MG: 10 TABLET ORAL at 14:09

## 2023-10-16 RX ADMIN — METHADONE HYDROCHLORIDE SCH MG: 5 TABLET ORAL at 22:12

## 2023-10-16 NOTE — PROVIDER PROGRESS NOTE
Progress Note





72 yo male w/metastatic prostate cancer w/C5 spinal cord compression causing 

acute ascending paralysis.


S:


Over the past 24 hours, pt notes he has continued to have RUE neuropathic pain 

which has been as high as 10/10 this am.  He has needed 10 mg of IV morphine 

since yesterday am (only 2 doses since my visit yesterday afternoon).  No PRN 

gabapentin since increasing the scheduled dose yesterday.  He continues to have 

some confusion - mentioned graffiti on the brick wall outside of his hospital 

room (which isn't there) and was observed shaking pepper onto his pineapple on 

his lunch tray.  He slept well last night.  No family at bedside.





O:


Gen-A&Ox2-3, pleasant middle aged male


HEENT - NC, face symmetric


Chest - CTAB


CV - mildly bradycardic w/RR, no M/R/G


Abd-soft, NT, ND, BT hypoactive


Extr- warm, No C/C/E


Neuro - poor control of the RUE,with his arm frequently flopping clumsily when 

he moves it, flaccid paralysis to BLEs





A:


1. Acute spinal cord compression d/t C5 lesion


2. Metastatic prostate cancer


3. Bradycardia


4. Dysphagia - no recurrence


5. Urinary retention


6. Confusion





P:


Continue IV morphine PRN.  Will increase methadone to 7.5 mg TID today.  Will 

reduce IV dex from 4 mg q6 to 4 mg q8. Plan to transition to po dex tomorrow.  

Will d/c IV scheduled lorazepam and give it po.   





Bradycardia remains mild and asymptomatic.  No dysphagia since 10/13.





Continue miranda catheter for urinary retention.  





He continues to have confusion, which is likely multifactorial from medications 

and disease process.





Hospice MSW working on disposition.  Pt states he would like to go home, rather 

than to an off-island SNF.





Pt aware of likely course of disease progression to include respiratory failure 

and progressive paralysis of the BUEs.  





Will transition to C LOC as pt has only needed 2 doses of PRN IV morphine 

since my visit yesterday and sxs appear to be well managed.

## 2023-10-17 ENCOUNTER — HOSPITAL ENCOUNTER (OUTPATIENT)
Dept: HOSPITAL 76 - EMS | Age: 73
Discharge: NURSING, REHAB OR LONG-TERM CARE | End: 2023-10-17
Attending: HOSPITALIST
Payer: COMMERCIAL

## 2023-10-17 VITALS — SYSTOLIC BLOOD PRESSURE: 126 MMHG | OXYGEN SATURATION: 96 % | DIASTOLIC BLOOD PRESSURE: 67 MMHG

## 2023-10-17 DIAGNOSIS — C61: ICD-10-CM

## 2023-10-17 DIAGNOSIS — R41.0: ICD-10-CM

## 2023-10-17 DIAGNOSIS — Z51.5: Primary | ICD-10-CM

## 2023-10-17 DIAGNOSIS — Z74.01: ICD-10-CM

## 2023-10-17 DIAGNOSIS — C79.51: ICD-10-CM

## 2023-10-17 RX ADMIN — METHADONE HYDROCHLORIDE SCH MG: 5 TABLET ORAL at 06:48

## 2023-10-17 RX ADMIN — GABAPENTIN SCH MG: 300 CAPSULE ORAL at 14:21

## 2023-10-17 RX ADMIN — BISACODYL SCH MG: 10 SUPPOSITORY RECTAL at 08:42

## 2023-10-17 RX ADMIN — BACLOFEN SCH MG: 10 TABLET ORAL at 06:43

## 2023-10-17 RX ADMIN — PANTOPRAZOLE SODIUM SCH MG: 40 TABLET, DELAYED RELEASE ORAL at 06:49

## 2023-10-17 RX ADMIN — METHADONE HYDROCHLORIDE SCH MG: 5 TABLET ORAL at 14:21

## 2023-10-17 RX ADMIN — GABAPENTIN SCH MG: 300 CAPSULE ORAL at 08:42

## 2023-10-17 RX ADMIN — BACLOFEN SCH MG: 10 TABLET ORAL at 14:22

## 2023-10-17 NOTE — DISCHARGE SUMMARY
Discharge Summary


Admit Date: 10/11/23


Discharge Date: 10/17/23


Discharging Provider: Nya


Code Status: Do Not Attempt Resuscitation


Condition at Discharge: Fair


Discharge Disposition: 03 SNF DC/Xfer


Discharge Facility Name: [facility]





- DIAGNOSES


Admission Diagnoses: 





1. Acute spinal cord compression d/t C5 lesion


2. Metastatic prostate cancer


3. Bradycardia


4. Urinary retention


5. Constipation


Discharge Diagnoses with Status of Each Condition: 





1. Acute spinal cord compression d/t C5 lesion,unchanged


2. Metastatic prostate cancer, unchanged


3. Bradycardia, improved


4. Urinary retention, stable, s/p miranda catheter


5. Constipation, improved


6. Dysphagia - single episode, resolved


7. Confusion-mild, persistent





- HPI


History of Present Illness: 





74 yo male w/metastatic prostate cancer, dx'd in 2016. He was found to have bone

mets in 7/23  (Rib, Rt T8, femur). He underwent XRT. He admitted to hospice on 


9/27/23 as he did not want to pursue further systemic therapy. He notes he has 

been doing very well overall despite the extent of his disease process. He


notes his pain has been fairly well controlled. He has been ambulatory and was 

out in his shop as recently as 3 days ago.. He'd been having some pain in his 

legs, but it was manageable. He also had been having some back pain, but it was 

improved w/current interventions (methadone, dexamethasone). He woke up around 2

am on 10/9/23 and took a shower. He notes he was okay at that time. However, 

later in the morning he noted weakness in the legs as well as onset of numbness.

He denied any falls. He noted that he felt "numb" from just below the nipple 

line down. He could identify pressure but not sensation. He stated he could move

his Lt leg, but not his right. He had not urinated since 0200 and did not feel 

any urge to urinate.





On exam, he had a T 5/6 dermatome level acute paralysis.  He was hyperreflexic. 

RN placed a miranda w/1400 cc urine output.  Pt elected to revoke hospice and 

pursue further diagnostics (and possible intervention).  [facility].  He was admitted, placed on IV steroids

and ultimately underwent MRI on 10/10/23 which revealed "tumor wrapped around 

the spinal cord" at C5 and it was felt there was no intervention available for 

him.  After admission to the hospital, he did develop ascending paresis which 

impacted his RUE and has caused some difficulty w/deep breathing.  Today, the 

Palliative Care team contacted [facility] re: readmission and [facility] level of care 

for mgmt of his IV pain medication needs.  The PC MD reported to me that he was 

needing frequent IV morphine for comfort.





Currently, pt c/o neuropathic pain primarily impacting his RUE.  He also notes 

if he moves his head too far, he develops neuropathic sxs extending down 

Bilateral medial thighs.  He continues to note some difficulty w/taking a deep 

breath and notes that attempting to take a deep breath increases the neuropathic

pain in his RUE.  He has not had a BM in 3 days.





- HOSPITAL COURSE


Hospital Course: 





Pt was admitted to hospice [facility] level of care for management of pain and acute 

ascending paralysis d/t tumor expansion at C5 and throughout the spinal column. 

He was placed on IV steroids, IV morphine, IV lorazepam as well as po methadone,

gabapentin, baclofen and bowel regimen.  Over the course of his hospital stay, 

his pain medications were titrated up w/good effect.  He was transitioned to [facility]

level of care on 10/16/23.  He was accepted to [facility] for snf 

care on 10/17/23.  Pt is discharged in fair condition.  His prognosis is likely 

at most 2-3 weeks.





- ALLERGIES


Allergies/Adverse Reactions: 


                                    Allergies











Allergy/AdvReac Type Severity Reaction Status Date / Time


 


No Known Drug Allergies Allergy   Verified 07/03/23 07:23














- MEDICATIONS


Home Medications: 


                                Ambulatory Orders











 Medication  Instructions  Recorded  Confirmed


 


Senna [Senokot] 8.6 mg PO BID 10/12/23 10/12/23


 


Acetaminophen [Tylenol] 650 mg PO Q4HR PRN  tab 10/17/23 


 


Baclofen [Lioresal] 5 mg PO TID  tab 10/17/23 


 


Gabapentin [Neurontin] 300 mg PO 1400  cap 10/17/23 


 


Gabapentin [Neurontin] 600 mg PO BID  cap 10/17/23 


 


LORazepam [Ativan] 0.5 mg PO TID  tab 10/17/23 


 


Methadone [Methadone Hcl] 7.5 mg PO TID  tab 10/17/23 


 


Min Oil/Dimeth/Coconut Oil Crm 1 applic TOP PRN PRN  each 10/17/23 





[Cavilon]   


 


Mirtazapine [Remeron] 15 mg PO QPM  tab 10/17/23 


 


Morphine Oral Soln [Roxanol] 5 mg PO Q1H PRN  ml 10/17/23 


 


Pantoprazole [Protonix] 40 mg PO QDAC  tab 10/17/23 


 


dexAMETHasone [Decadron] 4 mg PO TID  tab 10/17/23 


 


polyethylene glycoL 3350 [Miralax] 17 gm PO DAILY  packet 10/17/23 














- PHYSICAL EXAM AT DISCHARGE


Physical Exam Other/Comments: 





See progress note from today





- TIME SPENT


Time Spent in Discharge (Minutes): 50

## 2023-10-17 NOTE — PROVIDER PROGRESS NOTE
Progress Note








74 yo male w/metastatic prostate cancer w/C5 spinal cord compression causing 

acute ascending paralysis.


S:


Pt is in good spirits today.  He received IV morphine this am prior to a 

bedbath.  He notes he slept about 4 hours overnight.  He does c/o increased 

neuropathic pain in his LUE (was just at the elbow, now down the forearm).  

Breathing well controlled.  Dtr notes that he was somewhat confused this am, 

stating she had polka dots on her face.





O:


Gen-A&Ox2-3, pleasant middle aged male


HEENT - NC, face symmetric


Chest - CTAB


CV - RRR, no M/R/G


Abd-soft, NT, ND, BT hypoactive


Extr- warm, No C/C/E


Neuro - poor control of the RUE,with his arm frequently flopping clumsily when 

he moves it, flaccid paralysis to BLEs





A:


1. Acute spinal cord compression d/t C5 lesion


2. Metastatic prostate cancer


3. Bradycardia


4. Dysphagia - no recurrence


5. Urinary retention


6. Confusion





P:


Tolerating med changes from yesterday.  continues methadone 7.5 mg po TID, dex 

po 4 mg TID, baclofen 5 mg TID, lorazepam 0.5 mg TID.  Had one po dose of PRN 

morphine yesterday and one IV dose today.  Continue miranda catheter.  Pt had a BM

this am.  Pt has been accepted to [facility] today for retirement care.

## 2023-10-17 NOTE — DISCHARGE PLAN
Discharge Plan for SNF / [facility]





- Discharge Plan And Transition Orders


Problem Reviewed?: Yes


Disposition: 03 SNF DC/Xfer


Condition: Fair


Allergies and Adverse Reactions: 


                                    Allergies











Allergy/AdvReac Type Severity Reaction Status Date / Time


 


No Known Drug Allergies Allergy   Verified 07/03/23 07:23














- SNF / [facility] Transition Orders


Admit to (Facility): [facility]


Under the care of (Name): [facility]


Discharge Diagnosis: 





1.  C5 spinal cord compression and paralysis d/t metastatic prostate cancer


Medicare Certification Statement: 


I certify that [facility] Hospital skilled nursing care is medically necessary on a 

continuing basis for any of the conditions for which she/he is receiving care 

during hospitalization.





 





Weight on admission and: Weekly


Other Notification Orders: 


Call PCP immediately if patient develops dyspnea, chest pain/tightness or edema.





House Bowel Program: Yes


Additional Bowel Program Orders: 


If no BM after 2 days, nurse may give M.O.M. 30ml PO PRN and/or ducolax Supp 1 

WA and/or ISABELLE 250mg P.O., and/or senna 1-2 tabs PO.  On day 3 nurse may give 

repeat above order until residents constipation is resolved. 





Annual Influenza Vaccine (between Sept 1st and March 31st): Yes


Two-step PPD per [facility] [id number] or approved exception documents: Yes


Treatments & Other Orders: Reposition q3 hours


Oxygen Orders: N/A


Medication Orders: 





PLEASE REFER TO THE DISCHARGE MEDICATION LIST.











- Diet


Texture: Regular


Liquids: Thin


Supplements: N/A





- Therapies | Activity


Activity: Bed bound d/t paralysis